# Patient Record
Sex: MALE | Race: WHITE | NOT HISPANIC OR LATINO | Employment: UNEMPLOYED | URBAN - METROPOLITAN AREA
[De-identification: names, ages, dates, MRNs, and addresses within clinical notes are randomized per-mention and may not be internally consistent; named-entity substitution may affect disease eponyms.]

---

## 2018-11-13 ENCOUNTER — HOSPITAL ENCOUNTER (EMERGENCY)
Facility: HOSPITAL | Age: 38
Discharge: HOME | End: 2018-11-13
Attending: EMERGENCY MEDICINE | Admitting: EMERGENCY MEDICINE

## 2018-11-13 ENCOUNTER — APPOINTMENT (OUTPATIENT)
Dept: GENERAL RADIOLOGY | Facility: HOSPITAL | Age: 38
End: 2018-11-13

## 2018-11-13 VITALS
SYSTOLIC BLOOD PRESSURE: 135 MMHG | DIASTOLIC BLOOD PRESSURE: 78 MMHG | RESPIRATION RATE: 18 BRPM | HEART RATE: 90 BPM | OXYGEN SATURATION: 98 % | BODY MASS INDEX: 22.35 KG/M2 | TEMPERATURE: 98 F | WEIGHT: 165 LBS | HEIGHT: 72 IN

## 2018-11-13 DIAGNOSIS — M54.32 SCIATICA OF LEFT SIDE: Primary | ICD-10-CM

## 2018-11-13 PROCEDURE — 72100 X-RAY EXAM L-S SPINE 2/3 VWS: CPT

## 2018-11-13 PROCEDURE — 99283 EMERGENCY DEPT VISIT LOW MDM: CPT

## 2018-11-13 PROCEDURE — 63710000001 PREDNISONE PER 1 MG: Performed by: EMERGENCY MEDICINE

## 2018-11-13 RX ORDER — CYCLOBENZAPRINE HCL 10 MG
10 TABLET ORAL 3 TIMES DAILY PRN
Qty: 12 TABLET | Refills: 0 | Status: SHIPPED | OUTPATIENT
Start: 2018-11-13

## 2018-11-13 RX ORDER — PREDNISONE 20 MG/1
TABLET ORAL
Qty: 18 TABLET | Refills: 0 | Status: SHIPPED | OUTPATIENT
Start: 2018-11-13

## 2018-11-13 RX ORDER — PREDNISONE 20 MG/1
60 TABLET ORAL ONCE
Status: COMPLETED | OUTPATIENT
Start: 2018-11-13 | End: 2018-11-13

## 2018-11-13 RX ORDER — HYDROCODONE BITARTRATE AND ACETAMINOPHEN 5; 325 MG/1; MG/1
1 TABLET ORAL ONCE
Status: COMPLETED | OUTPATIENT
Start: 2018-11-13 | End: 2018-11-13

## 2018-11-13 RX ORDER — DICLOFENAC POTASSIUM 50 MG/1
50 TABLET, FILM COATED ORAL 3 TIMES DAILY
Qty: 15 TABLET | Refills: 0 | Status: SHIPPED | OUTPATIENT
Start: 2018-11-13

## 2018-11-13 RX ADMIN — PREDNISONE 60 MG: 20 TABLET ORAL at 13:10

## 2018-11-13 RX ADMIN — HYDROCODONE BITARTRATE AND ACETAMINOPHEN 1 TABLET: 5; 325 TABLET ORAL at 13:10

## 2018-11-13 NOTE — ED PROVIDER NOTES
Subjective   Mr. Jonathon Yepez is a 37-year-old male presenting to the emergency department with complaints of left hip pain. The patient states that he has a history of degenerative disc disease and has had 2 lumbar spine surgeries to remove bone spurs that were causing pain radiating down his right leg. His neurosurgeon is Dr. Sánchez. He has had no complications following these surgeries, but in the past three months has developed pain in his left hip, radiating down the left leg. This pain has significantly worsened in the past few days. He endorses accompanying chills, as well as numbness and weakness in the left leg, as well as trouble walking or bearing weight. He denies any fever or bowel or bladder incontinence. He denies a history of DM or any cardiac problems. There are no other acute complaints at this time.        History provided by:  Patient  Lower Extremity Issue   Location:  Hip  Injury: no    Hip location:  L hip  Pain details:     Radiates to:  L leg    Severity:  Severe    Onset quality:  Gradual    Duration:  3 months    Timing:  Constant    Progression:  Worsening  Chronicity:  Chronic  Dislocation: no    Foreign body present:  No foreign bodies  Prior injury to area:  No  Relieved by:  None tried  Worsened by:  Bearing weight and activity  Ineffective treatments:  None tried  Associated symptoms: back pain (Lumbar spine), numbness and stiffness    Associated symptoms: no fever    Risk factors comment:  Hx DDD      Review of Systems   Constitutional: Positive for chills. Negative for fever.   HENT: Negative.    Respiratory: Negative.    Cardiovascular: Negative.    Gastrointestinal: Negative.    Genitourinary: Negative.    Musculoskeletal: Positive for arthralgias (Left hip), back pain (Lumbar spine), gait problem, myalgias (Left leg) and stiffness.   Skin: Negative.    Neurological: Positive for numbness (Left leg).   Psychiatric/Behavioral: Negative.    All other systems reviewed and are  negative.      Past Medical History:   Diagnosis Date   • H/O degenerative disc disease        No Known Allergies    Past Surgical History:   Procedure Laterality Date   • BACK SURGERY     • CYST REMOVAL      from neck       History reviewed. No pertinent family history.    Social History     Socioeconomic History   • Marital status:      Spouse name: Not on file   • Number of children: Not on file   • Years of education: Not on file   • Highest education level: Not on file   Tobacco Use   • Smoking status: Current Every Day Smoker     Packs/day: 1.00     Types: Cigarettes   • Smokeless tobacco: Never Used   Substance and Sexual Activity   • Alcohol use: Yes     Comment: 3 vodka daily    • Drug use: No   • Sexual activity: Defer         Objective   Physical Exam   Constitutional: He is oriented to person, place, and time. He appears well-developed and well-nourished. No distress.   HENT:   Head: Normocephalic and atraumatic.   Nose: Nose normal.   Eyes: Conjunctivae are normal. No scleral icterus.   Neck: Normal range of motion. Neck supple.   Cardiovascular: Normal rate, regular rhythm and normal heart sounds.   No murmur heard.  Pulmonary/Chest: Effort normal. No respiratory distress.   Abdominal: Soft. There is no tenderness.   Musculoskeletal: Normal range of motion.   No pain with rotation of the hips, significant pain with flexion and extension of the left leg and foot. 1+ DTR in the knees and ankles.   Neurological: He is alert and oriented to person, place, and time.   Skin: Skin is warm and dry.   2 scars from prior back surgeries are well-healed, no rash or lesions.   Psychiatric: He has a normal mood and affect. His behavior is normal.   Nursing note and vitals reviewed.      Procedures         ED Course     No results found for this or any previous visit (from the past 24 hour(s)).  Note: In addition to lab results from this visit, the labs listed above may include labs taken at another facility  "or during a different encounter within the last 24 hours. Please correlate lab times with ED admission and discharge times for further clarification of the services performed during this visit.    XR Spine Lumbar 2 or 3 View   Preliminary Result   Mild lumbar spine degenerative change without acute   osseous abnormality.       D:  11/13/2018   E:  11/13/2018                    Vitals:    11/13/18 1131   BP: 141/85   BP Location: Left arm   Patient Position: Sitting   Pulse: 98   Resp: 18   Temp: 98 °F (36.7 °C)   TempSrc: Oral   SpO2: 100%   Weight: 74.8 kg (165 lb)   Height: 182.9 cm (72\")     Medications   HYDROcodone-acetaminophen (NORCO) 5-325 MG per tablet 1 tablet (1 tablet Oral Given 11/13/18 1310)   predniSONE (DELTASONE) tablet 60 mg (60 mg Oral Given 11/13/18 1310)     ECG/EMG Results (last 24 hours)     ** No results found for the last 24 hours. **                        MDM  Number of Diagnoses or Management Options  Sciatica of left side: new and requires workup     Amount and/or Complexity of Data Reviewed  Tests in the radiology section of CPT®: reviewed and ordered  Discuss the patient with other providers: yes    Patient Progress  Patient progress: stable      Final diagnoses:   Sciatica of left side       Documentation assistance provided by abilio Ibrahim.  Information recorded by the abilio was done at my direction and has been verified and validated by me.     Toby Ibrahim  11/13/18 0230       Stanley Merino PA  11/13/18 1196    "

## 2018-11-13 NOTE — DISCHARGE INSTRUCTIONS
Follow up with one of the physician centers below to setup primary care.    [facility], [facility], [phone], [address], [address], [place], [address]    [facility], [phone], [address], [place], [address]    [facility], [phone], [address] [place], [address];     [facility], [phone], [address], [facility]    Follow up with one of the [facility] Primary Care Providers below to setup primary care. If you need assistance coordinating a primary care appointment with a [facility] Primary Care Provider, please contact the Primary Care Coordinators at [phone] for appointment scheduling.    [facility], Primary Care   [address], [address]   [place], [place] [address] [phone]    [facility] Endocrinology  [address], [address]  [place] [address] [phone]    [facility]  [address], [address]   [place] [address] [phone]    [facility]  [address], [address]  [place], [place] [id number] [phone]    [facility], Primary Care,   [address], [address]   [place], [place] [address] [phone]    [facility]  [address], [address]  [place], [place] [address] [phone]    [facility], Primary Care  [address], [address]  [place], [place] [id number] [phone]    [facility]  [address], [address]   [place] [address] [phone]    [facility], Primary Care  [address], [place] [address] [phone]      [facility]  [address], [address]   [place] [address] [phone]    [facility]  [address] [place] [address] [place] [address] [phone]